# Patient Record
Sex: FEMALE | Race: WHITE
[De-identification: names, ages, dates, MRNs, and addresses within clinical notes are randomized per-mention and may not be internally consistent; named-entity substitution may affect disease eponyms.]

---

## 2020-02-03 ENCOUNTER — HOSPITAL ENCOUNTER (EMERGENCY)
Dept: HOSPITAL 95 - ER | Age: 70
Discharge: HOME | End: 2020-02-03
Payer: MEDICARE

## 2020-02-03 VITALS — WEIGHT: 167.99 LBS | BODY MASS INDEX: 26.37 KG/M2 | HEIGHT: 67 IN

## 2020-02-03 DIAGNOSIS — M54.32: Primary | ICD-10-CM

## 2020-02-03 DIAGNOSIS — Z79.899: ICD-10-CM

## 2020-02-03 DIAGNOSIS — Z88.6: ICD-10-CM

## 2020-02-07 ENCOUNTER — HOSPITAL ENCOUNTER (INPATIENT)
Dept: HOSPITAL 95 - ER | Age: 70
LOS: 10 days | Discharge: HOME | DRG: 326 | End: 2020-02-17
Attending: SURGERY | Admitting: SURGERY
Payer: MEDICARE

## 2020-02-07 VITALS — BODY MASS INDEX: 25.26 KG/M2 | WEIGHT: 166.67 LBS | HEIGHT: 67.99 IN

## 2020-02-07 DIAGNOSIS — G93.40: ICD-10-CM

## 2020-02-07 DIAGNOSIS — E87.6: ICD-10-CM

## 2020-02-07 DIAGNOSIS — F32.9: ICD-10-CM

## 2020-02-07 DIAGNOSIS — E78.00: ICD-10-CM

## 2020-02-07 DIAGNOSIS — K25.5: Primary | ICD-10-CM

## 2020-02-07 DIAGNOSIS — I48.0: ICD-10-CM

## 2020-02-07 DIAGNOSIS — E03.9: ICD-10-CM

## 2020-02-07 DIAGNOSIS — F03.91: ICD-10-CM

## 2020-02-07 DIAGNOSIS — E53.8: ICD-10-CM

## 2020-02-07 DIAGNOSIS — K66.8: ICD-10-CM

## 2020-02-07 DIAGNOSIS — K65.9: ICD-10-CM

## 2020-02-07 LAB
ALBUMIN SERPL BCP-MCNC: 3.7 G/DL (ref 3.4–5)
ALBUMIN/GLOB SERPL: 1.2 {RATIO} (ref 0.8–1.8)
ALT SERPL W P-5'-P-CCNC: 23 U/L (ref 12–78)
ANION GAP SERPL CALCULATED.4IONS-SCNC: 7 MMOL/L (ref 6–16)
AST SERPL W P-5'-P-CCNC: 24 U/L (ref 12–37)
BASOPHILS # BLD AUTO: 0.02 K/MM3 (ref 0–0.23)
BASOPHILS NFR BLD AUTO: 0 % (ref 0–2)
BILIRUB SERPL-MCNC: 0.8 MG/DL (ref 0.1–1)
BUN SERPL-MCNC: 21 MG/DL (ref 8–24)
CALCIUM SERPL-MCNC: 8.6 MG/DL (ref 8.5–10.1)
CHLORIDE SERPL-SCNC: 109 MMOL/L (ref 98–108)
CO2 SERPL-SCNC: 25 MMOL/L (ref 21–32)
CREAT SERPL-MCNC: 0.79 MG/DL (ref 0.4–1)
DEPRECATED RDW RBC AUTO: 47.6 FL (ref 35.1–46.3)
EOSINOPHIL # BLD AUTO: 0.03 K/MM3 (ref 0–0.68)
EOSINOPHIL NFR BLD AUTO: 0 % (ref 0–6)
ERYTHROCYTE [DISTWIDTH] IN BLOOD BY AUTOMATED COUNT: 12.1 % (ref 11.7–14.2)
GLOBULIN SER CALC-MCNC: 3.1 G/DL (ref 2.2–4)
GLUCOSE SERPL-MCNC: 169 MG/DL (ref 70–99)
HCT VFR BLD AUTO: 38 % (ref 33–51)
HGB BLD-MCNC: 13 G/DL (ref 11.5–16)
IMM GRANULOCYTES # BLD AUTO: 0.04 K/MM3 (ref 0–0.1)
IMM GRANULOCYTES NFR BLD AUTO: 0 % (ref 0–1)
LYMPHOCYTES # BLD AUTO: 1.42 K/MM3 (ref 0.84–5.2)
LYMPHOCYTES NFR BLD AUTO: 10 % (ref 21–46)
MCHC RBC AUTO-ENTMCNC: 34.2 G/DL (ref 31.5–36.5)
MCV RBC AUTO: 105 FL (ref 80–100)
MONOCYTES # BLD AUTO: 0.73 K/MM3 (ref 0.16–1.47)
MONOCYTES NFR BLD AUTO: 5 % (ref 4–13)
NEUTROPHILS # BLD AUTO: 11.6 K/MM3 (ref 1.96–9.15)
NEUTROPHILS NFR BLD AUTO: 84 % (ref 41–73)
NRBC # BLD AUTO: 0 K/MM3 (ref 0–0.02)
NRBC BLD AUTO-RTO: 0 /100 WBC (ref 0–0.2)
PLATELET # BLD AUTO: 231 K/MM3 (ref 150–400)
POTASSIUM SERPL-SCNC: 3.6 MMOL/L (ref 3.5–5.5)
PROT SERPL-MCNC: 6.8 G/DL (ref 6.4–8.2)
RBC #/AREA URNS HPF: (no result) /HPF (ref 0–2)
SODIUM SERPL-SCNC: 141 MMOL/L (ref 136–145)
SP GR SPEC: 1.02 (ref 1–1.02)
UROBILINOGEN UR STRIP-MCNC: (no result) MG/DL
WBC #/AREA URNS HPF: (no result) /HPF (ref 0–5)

## 2020-02-07 PROCEDURE — 0DU707Z SUPPLEMENT STOMACH, PYLORUS WITH AUTOLOGOUS TISSUE SUBSTITUTE, OPEN APPROACH: ICD-10-PCS | Performed by: SURGERY

## 2020-02-07 PROCEDURE — C9113 INJ PANTOPRAZOLE SODIUM, VIA: HCPCS

## 2020-02-07 PROCEDURE — A9270 NON-COVERED ITEM OR SERVICE: HCPCS

## 2020-02-07 PROCEDURE — P9612 CATHETERIZE FOR URINE SPEC: HCPCS

## 2020-02-07 NOTE — NUR
IT WAS DETERMINED THAT PATIENT WAS OKAY TO SIGN OWN CONSENT. ABLE TO REP0RT
UNDERSTANDING OF ABD SURGERY DUE TO ABD PAIN AND NAME AND

## 2020-02-07 NOTE — NUR
History, Chart, Medications and Allergies reviewed before start of
procedure.PER ER DAUGHTER ON HER WAY FROM [place]. WILL NEED TO GET TELEPHONE
CONSENT FROM DAUGHTER DUE TO HX DEMENTIA.

## 2020-02-08 LAB
ANION GAP SERPL CALCULATED.4IONS-SCNC: 5 MMOL/L (ref 6–16)
BASOPHILS # BLD: 0 K/MM3 (ref 0–0.23)
BASOPHILS NFR BLD: 0 % (ref 0–2)
BUN SERPL-MCNC: 17 MG/DL (ref 8–24)
CALCIUM SERPL-MCNC: 8 MG/DL (ref 8.5–10.1)
CHLORIDE SERPL-SCNC: 113 MMOL/L (ref 98–108)
CO2 SERPL-SCNC: 25 MMOL/L (ref 21–32)
CREAT SERPL-MCNC: 0.84 MG/DL (ref 0.4–1)
DEPRECATED RDW RBC AUTO: 47.7 FL (ref 35.1–46.3)
EOSINOPHIL # BLD: 0 K/MM3 (ref 0–0.68)
EOSINOPHIL NFR BLD: 0 % (ref 0–6)
ERYTHROCYTE [DISTWIDTH] IN BLOOD BY AUTOMATED COUNT: 12.2 % (ref 11.7–14.2)
GLUCOSE SERPL-MCNC: 147 MG/DL (ref 70–99)
HCT VFR BLD AUTO: 35.2 % (ref 33–51)
HGB BLD-MCNC: 11.9 G/DL (ref 11.5–16)
LYMPHOCYTES # BLD: 0.64 K/MM3 (ref 0.84–5.2)
LYMPHOCYTES NFR BLD: 7 % (ref 21–46)
MCHC RBC AUTO-ENTMCNC: 33.8 G/DL (ref 31.5–36.5)
MCV RBC AUTO: 106 FL (ref 80–100)
MONOCYTES # BLD: 0.37 K/MM3 (ref 0.16–1.47)
MONOCYTES NFR BLD: 4 % (ref 4–13)
NEUTS BAND NFR BLD MANUAL: 24 % (ref 0–8)
NEUTS SEG # BLD MANUAL: 8.23 K/MM3 (ref 1.96–9.15)
NEUTS SEG NFR BLD MANUAL: 65 % (ref 41–73)
NRBC # BLD AUTO: 0 K/MM3 (ref 0–0.02)
NRBC BLD AUTO-RTO: 0 /100 WBC (ref 0–0.2)
PLATELET # BLD AUTO: 180 K/MM3 (ref 150–400)
POTASSIUM SERPL-SCNC: 3.4 MMOL/L (ref 3.5–5.5)
SODIUM SERPL-SCNC: 143 MMOL/L (ref 136–145)
TOTAL CELLS COUNTED BLD: 100

## 2020-02-08 NOTE — NUR
PT TO ICU 13 @ 2045 VIA CHRIS WITH PACU RN. PT ALERT AND ORIENTED TO SELF,
LOCATION, MONTH AND FOLLOWING DIRECTIONS. PT CONFUSED TO EVENTS, STS SHE HAS
BEEN IN THE HOSPITAL FOR A COUPLE OF DAYS. PT PLEASANT AND VERY APPRECIATIVE
OF NURSING CARE, VERY FORGETFUL, BUT REDIRECTABLE AND COOPERATIVE.  UPON
ARRIVAL TO UNIT, PT ON 10L O2 PER OXIMIZER. PT TITRATED DOWN TO 2L O2 PER NC
(SEE FLOWSHEET), O2 SATURATIONS>90%. MONITOR SHOWS SINUS RHYTHM, HR 70'S, BP
STABLE. PT WITH NG, CLAMPED, DENIES NAUSEA AT THIS TIME. CARLY WOUND VAC TO MID
ABD, SMALL AMOUNT OF SANGUINEOUS DRAINAGE NOTED, AALIYAH DRAIN TO LOWER ABD WITH
SEROSANGUINEOUS DRAINAGE. PT DENIES PAIN AT THIS TIME EXCEPT WITH COUGH AND
REPOSITIONING, DENIES NEED FOR PAIN MEDICATION AT THIS TIME, EDUCATED PT ON
SPLINTING WITH PILLOW TO DECREASE PAIN WHILE COUGHING. DOLAN IN PLACE DRAINING
STEPHANIE COLORED URINE, SCD'S IN PLACE, LR AND PROTONIX GTT INITIATED, CALL LIGHT
WITHIN REACH.
PTS DAUGHTER TO ROOM, EXPRESSES CONCERN REGARDING PT LIVING ALONE, STS PT HAS
HAD INCREASED DIFFICULTY REMEMBERING TO TAKE DAILY MEDICATIONS, GO TO DOCTORS
APPOINTMENTS, AND PERFORM ADL'S. PT HAS BEEN RESISTANT TO RECEIVING ANY SORT
OF ASSISTANCE FROM FAMILY AND CARE GIVERS.

## 2020-02-08 NOTE — NUR
CARE ASSUMED
ASSESSMENT COMPLETED, PT AWAKE, ALERT AND ORINETED, SPEECH SLOW AND SLURRED,
THIS IS BASELINE ACCORDING TO RN REPORT. VSS, PT DENIES PAIN AT THIS TIME, ATE
SOME BREAKFAST WITHOUT DIFFICULTY. PT JAUNDICED, BRUISING NOTED TO UE'S, LE'S
EDEMATOUS, ABX OINTMENT APPLIED PER ORDERS. R INNER THIGH WEEPING, DRESSING
CHANGED. PT DENIES C/O AT THIS TIME, IS PLEASANT AND COOPERATIVE. WIFE AT
BEDSIDE. LANETTE GRIJALVA AND YEE IN TO ASSESS, PT TO DIALYSIS AT 0905.

## 2020-02-08 NOTE — NUR
SHIFT SUMMARY
 
PT REMAINS STABLE T/O NIGHT, RESTED WELL, AWOKE @ APPROX 0330 WITH SOME
COMPLAINTS OF ABD PAIN, PRN PN MEDICATION PROVIDED, SEE CALL PLACED TO DR STANLEY. PT ON 2L O2 PER NC, OXYGEN SATURATIONS>90%. MONITOR SHOWS SINUS
RHYTHM, HR 70'S, BP STABLE. WOUND VAC IN PLACE, SMALL AMOUNT OF DRAINAGE.
AALIYAH DRAIN IN PLACE WITH 90ml DRAINAGE THIS SHIFT. PT COMPLAINS OF THIRST,
ORAL SWABS AND MOISTURIZER PROVIDED. PT VERY FORGETFUL, NEEDING REMINDING OF
NPO STATUS, DOLAN PRESTENT TO DRAIN URINE, AND REMINDING THAT DAUGHTER (WHO
LIVES OUT OF TOWN) VISITED LAST NIGHT AND WILL BE BACK THIS MORNING. PT
REMAINS PLEASANT AND COOPERATIVE, VERY APPRECIATIVE OF NURSING CARE.

## 2020-02-08 NOTE — NUR
CALL PLACED TO DR STANLEY REGARDING MORNING LABS, PAIN AND NG CLARIFICATION.
ORDER FOR DILAUDID PCA, 40 MEQ KCL IV, AND NG TO LOW INTERMITENT SUCTION.

## 2020-02-08 NOTE — NUR
UPDATE
PT CONTINUES TO REST, HAS BEEN SLEEPING MOST OF THE DAY. WOKE UP FOR AN HOUR
EARLIER FOR MD AND FAMILY VISIT. WAKES EASILY, IS CONFUSED/FORGETFUL BUT
APPROPRIATE. VSS T/O SHIFT, ABD ASSESSMENT REMAINS UNCHANGED, NO BT PRESENT.
PHUONG WITH 60ML SEROSANG OUTPUT, NO BLEEDING AT WOUND VAC SITE. SCANT OUTPUT FROM
NGT TO LIWS, OUTPUT GREEN BILE.  URINE OUTPUT LOW TODAY, DR. STANLEY NOTIFIED
EARLIER OF THIS, INSTRUCTIONS TO CONTINUE LR@100ML/HR AND MONITOR OUTPUT. PT
REPOSITIONING SELF IN BED, DENIES NEEDS AT THIS TIME.
 DISCUSSED PLAN OF CARE WITH PT'S SON, DAUGHTER AND DR. CABRALES.

## 2020-02-08 NOTE — NUR
CARE ASSUMED
ASSESSMENT COMPLETED, PT AWAKE AND ALERT, FORGETFUL, PLEASANT, VSS. CARLY
DRESSING TO MID ABD DRY AND INTACT WITH A SMALL AMOUNT OF DRIED BLOOD,
PRESENT, PHUONG DRAIN TO LLQ COMPRESSED WITH SEROSANG DRAINAGE. PT REPORTS 8/10
MID ABD PAIN, DENIES NAUSEA, MEDICATED PER ORDERS. BT ABSENT, NO DISTENSION
NOTED, ABD SOFT. DOLAN PATENT AND DRAINING, PROTONIX AND POTASSIUM INFUSING
PER ORDERS. NGT TO LIWS WITH SMALL AMOUNT OF GREEN BILE. DAUGHTER AT BEDSIDE
INTERMITTENTLY, PT SLEEPING AFTER DILAUDID.

## 2020-02-08 NOTE — NUR
FOLLOW UP WITH DR STANLEY REGARDING DILAUDID PCA, ORDER CHANGED TO 0.4MG
DILAUDID IV Q2H PRN FOR PAIN.

## 2020-02-08 NOTE — NUR
UPDATE
PT'S SON AND DAUGHTER AT BEDSIDE, DR. STANLEY IN TO SPEAK WITH PT AND FAMILY.
 CONSULTED FOR FAMILY CONCERNS OF PATIENT HAVING DEMENTIA AND
BEING UNABLE TO CARE FOR HERSELF AT HOME. PT CONTINUES TO REST, WAKES EASILY,
VSS. PT PALE, BP AND HR WNL.

## 2020-02-08 NOTE — NUR
PER PT'S DAUGHTER, PT HAS A DEEP BRAIN STIMULATOR IMPLANT IN LEFT SUBCLAVIAN
AREA TO TREAT ESSENTIAL TREMORS. PT DOES NOT HAVE AN AICD.

## 2020-02-08 NOTE — NUR
PT RESTING WELL AFTER DILAUDID, WOKEN FOR REPOSITIONING AND C/O PAIN,
MEDICATED AGAIN PER ORDERS. ABD ASSESSMENT UNCHANGED, VS REMAIN STABLE, PT
ORINETED BUT FORGETFUL, DENIES OTHER NEEDS AT THIS TIME, COOPERATIVE WITH
CARE.

## 2020-02-08 NOTE — NUR
ASSUMED CARE OF PT AT 1915. REPORT RECEIVED AT BEDSIDE. PT PRESENTS IN BED
SLEEPING. PT IN NO APPARENT DISTRESS AT THIS TIME. WILL COMPLETE ASSESSMENT
UPON NEXT PT CHECK. ALLOW PT TO REST. WILL REVIEW CHART AND PLAN OF CARE FOR
PT.

## 2020-02-09 LAB
ALBUMIN SERPL BCP-MCNC: 2.6 G/DL (ref 3.4–5)
ALBUMIN/GLOB SERPL: 0.8 {RATIO} (ref 0.8–1.8)
ALT SERPL W P-5'-P-CCNC: 22 U/L (ref 12–78)
ANION GAP SERPL CALCULATED.4IONS-SCNC: 3 MMOL/L (ref 6–16)
AST SERPL W P-5'-P-CCNC: 16 U/L (ref 12–37)
BASOPHILS # BLD AUTO: 0.02 K/MM3 (ref 0–0.23)
BASOPHILS NFR BLD AUTO: 0 % (ref 0–2)
BILIRUB SERPL-MCNC: 0.6 MG/DL (ref 0.1–1)
BUN SERPL-MCNC: 15 MG/DL (ref 8–24)
CALCIUM SERPL-MCNC: 8.3 MG/DL (ref 8.5–10.1)
CHLORIDE SERPL-SCNC: 113 MMOL/L (ref 98–108)
CO2 SERPL-SCNC: 28 MMOL/L (ref 21–32)
CREAT SERPL-MCNC: 0.76 MG/DL (ref 0.4–1)
DEPRECATED RDW RBC AUTO: 48.5 FL (ref 35.1–46.3)
EOSINOPHIL # BLD AUTO: 0.06 K/MM3 (ref 0–0.68)
EOSINOPHIL NFR BLD AUTO: 1 % (ref 0–6)
ERYTHROCYTE [DISTWIDTH] IN BLOOD BY AUTOMATED COUNT: 12.2 % (ref 11.7–14.2)
GLOBULIN SER CALC-MCNC: 3.1 G/DL (ref 2.2–4)
GLUCOSE SERPL-MCNC: 88 MG/DL (ref 70–99)
HCT VFR BLD AUTO: 34.3 % (ref 33–51)
HGB BLD-MCNC: 11.3 G/DL (ref 11.5–16)
IMM GRANULOCYTES # BLD AUTO: 0.03 K/MM3 (ref 0–0.1)
IMM GRANULOCYTES NFR BLD AUTO: 0 % (ref 0–1)
LYMPHOCYTES # BLD AUTO: 1.02 K/MM3 (ref 0.84–5.2)
LYMPHOCYTES NFR BLD AUTO: 15 % (ref 21–46)
MAGNESIUM SERPL-MCNC: 1.8 MG/DL (ref 1.6–2.4)
MCHC RBC AUTO-ENTMCNC: 32.9 G/DL (ref 31.5–36.5)
MCV RBC AUTO: 108 FL (ref 80–100)
MONOCYTES # BLD AUTO: 0.34 K/MM3 (ref 0.16–1.47)
MONOCYTES NFR BLD AUTO: 5 % (ref 4–13)
NEUTROPHILS # BLD AUTO: 5.54 K/MM3 (ref 1.96–9.15)
NEUTROPHILS NFR BLD AUTO: 79 % (ref 41–73)
NRBC # BLD AUTO: 0 K/MM3 (ref 0–0.02)
NRBC BLD AUTO-RTO: 0 /100 WBC (ref 0–0.2)
PLATELET # BLD AUTO: 149 K/MM3 (ref 150–400)
POTASSIUM SERPL-SCNC: 3.8 MMOL/L (ref 3.5–5.5)
PROT SERPL-MCNC: 5.7 G/DL (ref 6.4–8.2)
SODIUM SERPL-SCNC: 144 MMOL/L (ref 136–145)
TSH SERPL DL<=0.005 MIU/L-ACNC: 22.3 UIU/ML (ref 0.36–4.8)

## 2020-02-09 NOTE — NUR
PT'S NGT HAS ONLY SMALL AMOUNT OF CLEAR LIGHT GREEN LIQUID. PT HAS DENIED ANY
FLATUS THIS NIGHT. DOES HAVE HYPOACTIVE BOWEL TONES. WILL CONTINUE TO MONITOR
PT, AND WILL REPORT OFF TO ONCOMING RN.

## 2020-02-09 NOTE — NUR
PT BECAME HIGHLY AGITATED SCREAMING ABOUT GOING HOME TO TAKE CARE OF DOGS. PT
REMINDED THAT DAUGHTER IS TAKING CARE OF THEM, REFUSES TO BELEIVE STAFF AND
IS CONVINCED THAT SHE CAME IN ON HER OWN. PT MANAGED TO SLIP OUT OF RIGHT HAND
RESTRAINT AND SWUNG AT MYSELF WITTNESSED BY [facility]. PT THREATENED TO
PUNCH ME IN THE FACE.

## 2020-02-09 NOTE — NUR
PT RESTLESS, PULLING A RESTRAINTS, FORGETFUL . REORIENTATED AND 5 MINUTES
LATER PT REPEATS SAME REQUEST AND ATTEMPS TO GET OUT OF BED AND PULL OUT LINES

## 2020-02-09 NOTE — NUR
PT REQUETING TO GO HOME TO TAKE CARE OF DOGS. FORGOT DAUGHTER VISITED TODAY
AND DOES NOT KNOW WHY SHE CAME IN TO HOSPITAL. REQUESTED DOCTOR COME EVALUATE.
FORGETS SHE HAD SURGERY.

## 2020-02-09 NOTE — NUR
PT HAS BEEN MEDICATED THREE TIMES THIS NIGHT WITH 0.4 MG DILAUDID FOR
ABDOMINAL PAIN. PT'S PHUONG DRAIN HAS BEEN EMPTIED  ML SEROUSANGEOUS FLUID.
PT REMAINS PLEASANT AND VERY APPRECIATIVE OF STAFF. SHE HAS BEEN ABLE TO
REPOSITION HERSELF AT TIMES IN BED.

## 2020-02-09 NOTE — NUR
ASSUMED CARE AT 1915. PT ALERT TO SELF AND FAMILY AT SHIFT CHANGE. VITAL WNL.
SHORT TERM MEMORY LOSS. FORGETS SHE IS IN THE HOSPITAL MID CONVERSATION.
BECOMES AGITATED WITH THE RESTRAINTS, PT BELIEVE SHE IS AT HOME EVEN AFTER
REORIENTATED. DENIES PAIN BUT IS IN DISCOMFORT, WITH MOVEMENT AND REPOSITION.

## 2020-02-09 NOTE — NUR
PT HAS TOLERATED TURNS IN BED WELL. ABLE TO ASSIST WITH REPOSITIONING. NO
CHANGES TO PREVIOUS ASSESSMENT OF DRESSINGS. PHUONG BULB REMAINS COMPRESSED FOR
SUCTION. WILL CONTINUE TO MONITOR.

## 2020-02-09 NOTE — NUR
SHIFT SUMMARY
 
TODAY HAS BEEN QUITE THE DAY. SHE IS ALERT AND HAS BEEN AWAKE FOR MAJORITY OF
DAY, AND SHE IS ORIENTED TO HERSELF, FAMILY, AND GENERAL SURROUNDINGS. SHE HAS
SIGNS OF DEMENTIA, LIKE SHORT TERM MEMORY LOSS AND COGNITIVE DYSFUNCTION.
 
SOME EXAMPLES:
 
1. I PLACED A CUP OF WATER WITH MOUTH-WATTERING SWABS BY THE BED, AND SHE
PICKED UP THE CUP AND TRIED TO SUCK WATER THROUGH THE SWABS LIKE AS IF THEY
WERE STRAWS. AFTER STOPPING HER AND EXPLAINING WHAT THEY WERE, SHE PROCEEDED
TO DO IT AGAIN 3 MINUTES LATER.
 
2. SHE KEEPS TELLING ME ABOUT HER KIDS AND WHAT THEY DO PROFESSIONALLY AND
KEEPS GETTING SURPRISED THAT THEY HAVE BEEN VISITING ALL DAY.
 
3. ASKING WHEN SHE CAN LEAVE SO SHE CAN GO HOME AND TAKE CARE OF HER DOGS AND
NOT REALIZING SHE HAS HAD A SURGERY (OVER AND OVER AGAIN).  ALSO FORGETTING
THAT HER DAUGHTER IS VISITING FROM [place] AND HAS BEEN STAYING AT THE HOUSE.
 
4. KEEPS MESSING WITH THINGS THAT ARE CONNECTED TO HER (LINES, TUBES, DRAINS)
AND THINKING THEY ARE DIFFERENT THINGS. LIKKE THINKING THE PULSE OX WAS THE
CALL LIGHT BUTTON.
 
HER DAUGHTER, STATES, THIS IS HER BASELINE AT HOME. LIBBY (HER SON) AND
CAMI ARE BOTH VERY CONCERNED FOR HER SAFETY. FEELING THAT SHE IS UNABLE TO
TAKE CARE OF HERSELF. THEY WISH TO GAIN GAURDIANSHIP. THE LAST FEW HOURS SHES
BEEN TRYING TO GET UP, ONE TIME BEING SUCCESSFUL STANDING UP AND GETTING A FEW
STEPS AWAY FROM THE BED. HER DOLAN WAS TAKEN OUT (BALLOON STILL INFLATED) AND
WAS BLEEDING FROM HER URETHRA, WHICH HAD STOPPED SHORTLY AFTER. ONE OF HER
IV'S CAME OUT, SHE HAD PULLED OUT HER NG TUBE OUT TOO AND BROKE THE CARLY WOULD
VAC ALONG WITH TAKING OFF THE PHUONG DRAIN DRESSING. AND FORGETTING ABOUT DOING
ALL OF THIS. THIS OCCURED AND HAS BEEN OCCURING AFTER THE FAMILY LEFT. SHE HAD
BEEN IN [facility] ALL DAY, AND DESPITE BEING FORGETFUL AND CONFUSED. SHE
HAS SINCE BECOME VERY AGITATED, DEMANDING SHE LEAVE AND FEED HER DOGS. SHE HAS
CALLED HER FRIEND TO COME PICK HER UP, SAYING SHE IS READY TO GO HOME. WITH
EVERYTHING THAT HAPPENED AND IN ORDER TO KEEP HER SAFE SHE WAS PUT IN
RESTRAINTS BOTH A POSEY AND BILATERAL SWBs. DR ROBINS ORDERED 0.25MG ATIVAN
BUT HAD MINIMAL EFFECT. SHE REMAINS IN BED IN RESTRAINTS, SLIGHTLY MORE CALM
BUT REMAINS AGITATED AND CONVINCED SHE NEEDS TO LEAVE.
 
 IS CONSULTED AND WILL FOLLOW UP WITH FAMILY AND PT. BED LOW
AND LOCKED. CALL LIGHT HAS BEEN IN REACH ALL DAY.

## 2020-02-09 NOTE — NUR
ASSUMED CARE
 
PT IS IN BED, ALERT AND ORIENTED TO SELF, FAMILY, SURROUNDINGS (NOT PLACE -
THOUGHT SHE WAS IN A [facility] OR [facility]) AND SORT OF THE
SITUATION. SHE HAS SOME MEMORY ISSUES, ASKING THE SIMILAR QUESTIONS IN A ROW.
SHE HAS A MIDLINE INCISION THAT HAS A CARLY WOUND VAC TO IT, DRESSING HAS
MINIMAL DRAINAGE THAT IS NOT INCREASING, AND SHE HAS A PHUONG DRAIN IN HER LLQ
WITH MINIMAL SEROSANGUINOUS DRAINAGE. I BELIEVE SHE IS IN A SINUS RHYTHM BUT
WITH SOME T WAVE ABNORMALITIES AND POTENTIALLY A BUNDLE BRANCH BLOCK OF SOME
KIND. SHE HAS A STABLE RATE, AND SLIGHTLY ELEVATED BLOOD PRESSURES. SHE DENIES
PAIN UNLESS SHE COUGHS OR LAUGHS, KIMMY INSTRUCTED HER HOW TO USE A PILLOW WHEN
SHE HAS TO DO THOSE TWO THINGS. SHE CURRENTLY IS GONE TO IMAGING FOR HER CT
SCAN. SHE HAS A NG TUBE HOOKED UP TO LIS SHOWING A BILE-COLORED LIQUID. DENIES
NAUSEA. BED LOW AND LOCKED. CALL LIGHT WITHIN REACH.

## 2020-02-10 LAB
ALBUMIN SERPL BCP-MCNC: 2.6 G/DL (ref 3.4–5)
ANION GAP SERPL CALCULATED.4IONS-SCNC: 5 MMOL/L (ref 6–16)
BASOPHILS # BLD AUTO: 0.02 K/MM3 (ref 0–0.23)
BASOPHILS NFR BLD AUTO: 0 % (ref 0–2)
BUN SERPL-MCNC: 12 MG/DL (ref 8–24)
CALCIUM SERPL-MCNC: 8.3 MG/DL (ref 8.5–10.1)
CHLORIDE SERPL-SCNC: 108 MMOL/L (ref 98–108)
CO2 SERPL-SCNC: 27 MMOL/L (ref 21–32)
CREAT SERPL-MCNC: 0.61 MG/DL (ref 0.4–1)
DEPRECATED RDW RBC AUTO: 45.4 FL (ref 35.1–46.3)
EOSINOPHIL # BLD AUTO: 0.13 K/MM3 (ref 0–0.68)
EOSINOPHIL NFR BLD AUTO: 2 % (ref 0–6)
ERYTHROCYTE [DISTWIDTH] IN BLOOD BY AUTOMATED COUNT: 11.9 % (ref 11.7–14.2)
GLUCOSE SERPL-MCNC: 86 MG/DL (ref 70–99)
HCT VFR BLD AUTO: 32 % (ref 33–51)
HGB BLD-MCNC: 11 G/DL (ref 11.5–16)
IMM GRANULOCYTES # BLD AUTO: 0.03 K/MM3 (ref 0–0.1)
IMM GRANULOCYTES NFR BLD AUTO: 0 % (ref 0–1)
LYMPHOCYTES # BLD AUTO: 0.74 K/MM3 (ref 0.84–5.2)
LYMPHOCYTES NFR BLD AUTO: 11 % (ref 21–46)
MAGNESIUM SERPL-MCNC: 1.7 MG/DL (ref 1.6–2.4)
MCHC RBC AUTO-ENTMCNC: 34.4 G/DL (ref 31.5–36.5)
MCV RBC AUTO: 105 FL (ref 80–100)
MONOCYTES # BLD AUTO: 0.39 K/MM3 (ref 0.16–1.47)
MONOCYTES NFR BLD AUTO: 6 % (ref 4–13)
NEUTROPHILS # BLD AUTO: 5.44 K/MM3 (ref 1.96–9.15)
NEUTROPHILS NFR BLD AUTO: 81 % (ref 41–73)
NRBC # BLD AUTO: 0 K/MM3 (ref 0–0.02)
NRBC BLD AUTO-RTO: 0 /100 WBC (ref 0–0.2)
PHOSPHATE SERPL-MCNC: 1.7 MG/DL (ref 2.5–4.9)
PLATELET # BLD AUTO: 164 K/MM3 (ref 150–400)
POTASSIUM SERPL-SCNC: 3.3 MMOL/L (ref 3.5–5.5)
SODIUM SERPL-SCNC: 140 MMOL/L (ref 136–145)

## 2020-02-10 NOTE — NUR
BEDSIDE REPORT TAKEN AT 0715 THIS AM. PT'S DAUGHTER PRESENT AT BEDSIDE. PT
PLEASANT AND COOPERATIVE AT BEGINNING OF SHIFT WHILE DAUGHTER PRESENT. PT
ASSISTED T/O SHIFT TO COMMODE AND THEN TO TOILET TO VOID. PT ABLE TO BEAR
WEIGHT BUT HAS UNSTAEDY GAIT AND SOME WEAKNESS NOTED. PT IMPULSIVE AND NEEDS
SUPERVISION AT ALL TIMES. KELLIE VEST REMOVED THIS AM AT 1000 WITH DAUGHTER AT
BEDSIDE. DR VALLADARES IN TO SEE PT AND SPEAK W PT'S DAUGHTER AT 1120. CONSULT
WITH DR BOYD ORDERED FOR COGNITIVE ASSESSMENT 2ND TO DEMENTIA. KPHOS
RIDER ORDERED AND INFUSED. LR AT 100CC/HR AS PT NPO. DR STANLEY IN TO SEE PT
AT 0900; ABD XRAY ORDERED; PT DOWN TO XRAY FOR IMAGES. PLAN IS FOR A BARRIUM
SWALLOW TOMORROW AND THEN POSSIBLE DIET TOMORROW. DRSG TO MIDLINE ABD C/D/I
WITH CARLY WOUND VAC; SCANT TO NO DRAINAGE NOTED. PHUONG TO LLQ PUTTING OUT SCANT
SS FLUID.
AFTER PT'S DAUGHTER LEFT PT BECAME MORE AGITATED. PT CONTINUOUSLY CLIMBED OUT
OF BED WITHOUT ASSISTANCE; BED ALARM ON. PT WOULD STATE THAT SHE WAS GOING
HOME. PT WAS CONFUSED ABOUT DETAILS SURROUNDING SURGERY AND STATED THAT SHE
WAS FINE AND HAD JUST EATEN. PT BECAME PHYSICAL AT ONE POINT GRABBING MY ARM
AND TRYING TO WALK PAST/THROUGH ME. PT WAS DISTRACTED WITH PHYSICAL THERAPY;
BUT ONCE AGAIN BECAME FURTHER CONFUSED AND AGITATED. SHE PULLED OUT TWO IV'S,
BOTH INADVERTENTLY WHILE TRYING TO "LEAVE THE HOSPITAL TO GO HOME". DR BOYD CONSULTED PT AND PLACED PT ON 2MD HOLD AND RECOMMENDED PLACEMENT
IN [facility].
AFTER PHYSICAL THERAPY, PT WAS PLACED BACK ON TELEMETRY; PT WAS FOUND TO BE IN
AFIB W RVR; RATE 110-136. PT HYPERTENSIVE AS WELL. DR VALLADARES CALLED. ONE DOSE
OF IV CARDIAZEM 10MG GIVEN. PT ATTEMPTED TO GET OUT OF BED AGAIN SHORTLY
AFTER THIS. PT REFUSED TO COOPERATE WITH PLACEMENT OF POSEY AND BECAME VERY
AGITATED, ATTEMPTING TO LEAVE AGAIN. ATIVAN/BENADRYL/AND HALDOL WERE GIVEN
TOGETHER AS DR BOYD HAD RECOMMENDED. PT ASLEEP SHORTLY THERE AFTER. HTN
IMPROVED AND PT CONVERTED BACK INTO SINUS BRADYCARDIA. PT AROUSES FROM SLEEP
TO VOICE. POSEY VEST PLACED. REPORT GIVEN AT BEDSIDE AT 1920. CALL PLACED TO
PT'S DAUGHTER TO GIVE UPDATE.

## 2020-02-10 NOTE — NUR
Per admit trigger, I attempted to meet with Mrs. Broderick to offer prayer and
spiritual support.  She has been quite confused all day.  No family present.
this evening, she is finally resting.  Prayer provided at bedside.  I will
remain available to pt and family.

## 2020-02-10 NOTE — NUR
PT CONFUSED, SHORT-TERM MEMORY LOSS, DID NOT REMEMBER HAVING SURGERY, AND WAS
CONSTANLY TYING TO GET OUT OF AND AND LEAVE HOSPITAL. NO RECOLECTION OF HOW
SHE ARRIVED AT THE HOSPITAL. WHEN TOLD AND SHOWN SURGERY SITE AND DRAINS
WOULD SAY IT WAS OLD. AT ONE POINT SHE WAS THREATENING MYSELF AND DID SWING
TOWARDS MY FACE. PROVIDER CALLED HALDOL ORDERED AND GIVEN. PT STILL AGITATED.
SHE DID VERBALIZE PAIN ONCE. SHE DOES MOAN AND GRUNT WITH REPOSTION AND TURNS.
BUT REFUSED ANY MORE PAIN MEDS. SHE WAS ABLE TO SLIP OUT OF WRIST RESTRAINTS
MULTIPLE TIME, AND ATTEMPED TO GET OUT OF BED. PT DID NOT PULL AT IV,LINES
OR DRAIN AND WAS ABLE TO REST COMFORTABLE IN VEST ONLY. THE WRIST RESTRAINTS
WERE CASUING MORE AGGITATION THROUGHOUT THE NIGHT. NEW RESTAINT ORDER PLACED
FOR VEST ONLY. PT WOKE UP MORE COOPERTIVE.  WITH THAT SAID I DO BELEIVE SHE IS
A FALL/SAFETY RISK AND MAY NEED ASSISTANTS AT HOME AS WELL. POSSIBLY SUN
DOWNING. WILL CONTINUE TO MONITOR, MAY NEED PSYCH EVAL TO DX DEMENTIA OR
SIMULAR DIEASE PROCESS.

## 2020-02-10 NOTE — NUR
PT SLIPPED OUT OF RESTRAINT AGAIN. PT VERBALIZED PAIN, DILAUDID GIVEN. PT
STILL TRYING TO GO HOME AND DENIES HAVING SURGEY.

## 2020-02-10 NOTE — NUR
HELP PT TO BEDSIDE TOILET WITH NO OUTPUT. PLACED BACK IN BED. WHEN I WENT
REAPPLY RESTRAINTS PT SAT UP AND TRIED TO GET OUT OF BED AND GO HOME. WITH THE
CHARGE NURSE ASSISTED TO GET HER TO LAY BACK DOWN. ONCE SHE WAS LAID FLAT PT
WENT TO SLEEP.

## 2020-02-10 NOTE — NUR
ASSUMPTION OF CARE
 
ASSUMED CARE OF PT @ 1915, PT SLEEPING IN BED, REPORT FROM PREVIOUS SHIFT PT
MEDICATED WITH ATIVAN, BENADRYL AND ATIVAN. PT OPENS EYES WITH VERBAL STIMULI
AND GRIMACES WITH PAINFUL STIMULI, MUMBLES SOME WORDS AND QUICKLY FALLS BACK
ASLEEP. RESPIRATIONS EVEN AND UNLABORED, O2 SATURATIONS>90% ON 2L PER NC.
MONITOR SHOWS SINUS RHYTHM WITH HR 48-50'S, BP STABLE. IMPLANTED
NEUROSTIMULATOR DEVICE TO L UPPER CHEST. BOWEL TONES HYPOACTIVE, CARLY WOUND
VAC TO MIDLINE ABD, PHUONG DRAIN TO LLQ MINIMAL DRAINAGE NOTED. BED ALARM ON,
POSEY VEST IN PLACE. CALL LIGHT WITHIN REACH.

## 2020-02-10 NOTE — NUR
PT WOKE UP FROM [place]. DOES NOT REMEMBER ME OR OTHER STAFF, UNABLE TO REMEMBER
HOW SHE ARRIVED TO HOSPITAL AND IS DEMANDING US TO LET HER GO HOME. ABLE TO
PLAY THE PART AND TRY TO SAY WHAT STAFF WANTS TO HEAR IN ABLE TO LET HER
LEAVE. SHE DOES NOT REALIZE THAT SHE IS RECOVERING FROM A SURGERY. SHE IS
DENIAL AND IS A SAFETY RISK AND A CONCERN FOR HOME SAFETY BEING SHE LIVES
ALONE WITH HER DOGS.

## 2020-02-11 LAB
ALBUMIN SERPL BCP-MCNC: 2.4 G/DL (ref 3.4–5)
ALBUMIN/GLOB SERPL: 0.8 {RATIO} (ref 0.8–1.8)
ALT SERPL W P-5'-P-CCNC: 20 U/L (ref 12–78)
ANION GAP SERPL CALCULATED.4IONS-SCNC: 6 MMOL/L (ref 6–16)
AST SERPL W P-5'-P-CCNC: 17 U/L (ref 12–37)
BASOPHILS # BLD AUTO: 0.03 K/MM3 (ref 0–0.23)
BASOPHILS NFR BLD AUTO: 1 % (ref 0–2)
BILIRUB SERPL-MCNC: 0.8 MG/DL (ref 0.1–1)
BUN SERPL-MCNC: 11 MG/DL (ref 8–24)
CALCIUM SERPL-MCNC: 8.2 MG/DL (ref 8.5–10.1)
CHLORIDE SERPL-SCNC: 108 MMOL/L (ref 98–108)
CO2 SERPL-SCNC: 26 MMOL/L (ref 21–32)
CREAT SERPL-MCNC: 0.69 MG/DL (ref 0.4–1)
DEPRECATED RDW RBC AUTO: 43.7 FL (ref 35.1–46.3)
EOSINOPHIL # BLD AUTO: 0.23 K/MM3 (ref 0–0.68)
EOSINOPHIL NFR BLD AUTO: 4 % (ref 0–6)
ERYTHROCYTE [DISTWIDTH] IN BLOOD BY AUTOMATED COUNT: 11.7 % (ref 11.7–14.2)
GLOBULIN SER CALC-MCNC: 2.9 G/DL (ref 2.2–4)
GLUCOSE SERPL-MCNC: 79 MG/DL (ref 70–99)
HCT VFR BLD AUTO: 32.6 % (ref 33–51)
HGB BLD-MCNC: 11.4 G/DL (ref 11.5–16)
IMM GRANULOCYTES # BLD AUTO: 0.01 K/MM3 (ref 0–0.1)
IMM GRANULOCYTES NFR BLD AUTO: 0 % (ref 0–1)
LYMPHOCYTES # BLD AUTO: 0.91 K/MM3 (ref 0.84–5.2)
LYMPHOCYTES NFR BLD AUTO: 17 % (ref 21–46)
MAGNESIUM SERPL-MCNC: 1.6 MG/DL (ref 1.6–2.4)
MCHC RBC AUTO-ENTMCNC: 35 G/DL (ref 31.5–36.5)
MCV RBC AUTO: 102 FL (ref 80–100)
MONOCYTES # BLD AUTO: 0.4 K/MM3 (ref 0.16–1.47)
MONOCYTES NFR BLD AUTO: 7 % (ref 4–13)
NEUTROPHILS # BLD AUTO: 3.83 K/MM3 (ref 1.96–9.15)
NEUTROPHILS NFR BLD AUTO: 71 % (ref 41–73)
NRBC # BLD AUTO: 0 K/MM3 (ref 0–0.02)
NRBC BLD AUTO-RTO: 0 /100 WBC (ref 0–0.2)
PHOSPHATE SERPL-MCNC: 2.6 MG/DL (ref 2.5–4.9)
PLATELET # BLD AUTO: 182 K/MM3 (ref 150–400)
POTASSIUM SERPL-SCNC: 3.2 MMOL/L (ref 3.5–5.5)
PROT SERPL-MCNC: 5.3 G/DL (ref 6.4–8.2)
SODIUM SERPL-SCNC: 140 MMOL/L (ref 136–145)

## 2020-02-11 NOTE — NUR
REPORT CALLED TO TRACIE NEAL ON MEDICAL FLOOR. UPDATED DAUGHTER ON PTS STATUS AND
TRANSFER TO ROOM 346.

## 2020-02-11 NOTE — NUR
PT. BACK TO ROOM AFTER HAVING IMAGING WITH CONTRAST.  HAS HAD DIARRHEA
SEVEERAL TIMES SINCE RETURNING TO ROOM. PT. A STANDBY ASSIST TO THE BATHROOM.
PT. HAS NOT BEEN COMBATIVE TODAY AND HAS BEEN COOPERATIVE. PT. IS SLEEPING AT
THIS TIE.

## 2020-02-11 NOTE — NUR
TRANSFER TO ROOM 346
PT ARRIVED TO ROOM 346 FROM ICU13 AT APPROXIMATELY 0550. PT ALERT AND ORIENTED
TO SELF AND STAFF, FOLLOWING DIRECTION AND IS PLEASANT. PT ABLE TO TRANFER TO
NEW BED WITH A 1 PERSON ASSIST. ASSISTED PT TO BSC AND THEN HELPED BACK TO
BED, POSEY VEST ON. SHERIF CHANDLER STARTED. WILL CONTINUE TO MONITOR.

## 2020-02-11 NOTE — NUR
PT. TO RADIOLOGY FOR PROCEDURE VIA RIMARMANJIT.  KLELIE VEST REMOVED BEFORE SHE LEFT
AND IV S.L. LOCKED.REMOVED TELE AND NOTIFIED MONITOR TECH.

## 2020-02-12 NOTE — NUR
SHIFT SUMMARY
PT IS A 68 Y/O FEMALE, ADMITTED FOR PERITONITIS. SHE RECENTLY HAD AN ABD
SURGICAL REPAIR OF A PERFORATED ULCER, WITH A WOUND VAC IN PLACE ON THE
SURGICAL SITE AND A PHUONG DRAIN IN THE L MID ABD. PT IS CONFUSED, A&O X SELF
ONLY, AND A 1PA OUT OF BED. SHE WAS OVERALL COOPERATIVE WITH CARE. PT HAD
SEVERAL EPISODES OF DIARRHEA DURING THE NIGHT, INCLUDING ONE INCONTINENT
EPISODE. NO COMPLAINTS OF PAIN, NAUSEA OR SOB. PT SLEPT WELL THROUGH THE
NIGHT. VITAL SIGNS STABLE. NO OTHER ACUTE CHANGES IN PT CONDITION NOTED. WILL
CONTINUE TO MONITOR AND TREAT PER EMAR UNTIL HAND OFF TO DAY SHIFT RN.

## 2020-02-12 NOTE — NUR
PT. HAS PRETTY ANTSY ALL DAY TODAY, WOULDNOT STAY IN BED LOKKING FOR PHONE,
HER DOGS.  INSISTING SHE WAS GOING HOME BUT WITH ALL THIS SHE WAS REDIREECTED
EASILY.  THIS EVENING SHE WAS TRYING TO PULL HER PHUONG AS SHE WAS DEETERMINED SHE
WAS GOING HOME.  COULD NOT REDIRECT HER.  CALLED DR. BOYD IF WE COULD
GET SOME PO MEDS TO CALM HER DOWN. ZYPREXA 5 MG. GIVEN WHICH WAS NOT HELPING
AN HOUR LATER,  THEN PRESCRIBED 50 OF SEROQUEL.  AN HOUR LATER THERE WAS
NOT ANY CHANGE IN HER.  RESTRAINTS HAD TO BE PLACED FOR HER SAFETY WHICH SHE
WAS ABLE TO GET OUT OF.  CALLED DR. PICKETT AND HE ORDEERED A B52 SHOT
WHICH TOOK QUITE SOME TIME TO WORK.  NIGHT SHIFT STILL DEALING WITH HER, SHE
IS SCREAMING FOR JAMEEL WHO SHE SAYS IS HER .

## 2020-02-13 LAB
ANION GAP SERPL CALCULATED.4IONS-SCNC: 7 MMOL/L (ref 6–16)
BASOPHILS # BLD AUTO: 0.05 K/MM3 (ref 0–0.23)
BASOPHILS NFR BLD AUTO: 1 % (ref 0–2)
BUN SERPL-MCNC: 5 MG/DL (ref 8–24)
CALCIUM SERPL-MCNC: 8.4 MG/DL (ref 8.5–10.1)
CHLORIDE SERPL-SCNC: 111 MMOL/L (ref 98–108)
CO2 SERPL-SCNC: 26 MMOL/L (ref 21–32)
CREAT SERPL-MCNC: 0.65 MG/DL (ref 0.4–1)
DEPRECATED RDW RBC AUTO: 43.4 FL (ref 35.1–46.3)
EOSINOPHIL # BLD AUTO: 0.22 K/MM3 (ref 0–0.68)
EOSINOPHIL NFR BLD AUTO: 3 % (ref 0–6)
ERYTHROCYTE [DISTWIDTH] IN BLOOD BY AUTOMATED COUNT: 11.8 % (ref 11.7–14.2)
GLUCOSE SERPL-MCNC: 115 MG/DL (ref 70–99)
HCT VFR BLD AUTO: 36.4 % (ref 33–51)
HGB BLD-MCNC: 13.1 G/DL (ref 11.5–16)
IMM GRANULOCYTES # BLD AUTO: 0.03 K/MM3 (ref 0–0.1)
IMM GRANULOCYTES NFR BLD AUTO: 0 % (ref 0–1)
LYMPHOCYTES # BLD AUTO: 1.02 K/MM3 (ref 0.84–5.2)
LYMPHOCYTES NFR BLD AUTO: 15 % (ref 21–46)
MCHC RBC AUTO-ENTMCNC: 36 G/DL (ref 31.5–36.5)
MCV RBC AUTO: 101 FL (ref 80–100)
MONOCYTES # BLD AUTO: 0.61 K/MM3 (ref 0.16–1.47)
MONOCYTES NFR BLD AUTO: 9 % (ref 4–13)
NEUTROPHILS # BLD AUTO: 5.08 K/MM3 (ref 1.96–9.15)
NEUTROPHILS NFR BLD AUTO: 73 % (ref 41–73)
NRBC # BLD AUTO: 0 K/MM3 (ref 0–0.02)
NRBC BLD AUTO-RTO: 0 /100 WBC (ref 0–0.2)
PLATELET # BLD AUTO: 229 K/MM3 (ref 150–400)
POTASSIUM SERPL-SCNC: 2.8 MMOL/L (ref 3.5–5.5)
SODIUM SERPL-SCNC: 144 MMOL/L (ref 136–145)

## 2020-02-13 NOTE — NUR
PT. UP IN CHAIR FOR BREAKFAST, TOLERATED WELL BUT POSEY VEST IN PLACE.  2
PERSON ASSIST R/T WEAKNESS.  2 CNA'S TRANSFERRED PT. TO [facility] STOOD WELL ,THEN
SAT DOWN, CLLED TO ROOM BY CNA'S AS PT. HAD PASSED OUT, HRR, BREATHING EVEN,
BUT NO REPOSONSE.  CALLED RR AT 1026.  WENT TO MED ROOM AND PULLED NARCAN IN
CASE PT. OVERMEDICATED, ENDED UP NOT GIVING NARCAN AS PHARMACIST SAID IT WOULD
NOT WORK FOR THE MEDICATIONS HE WAS GIVEN, PT. TRANSFERRED TO BED, KELLIE VEST
REMOVED.  EKG DONE AT 1041 WHICH SHOWED PROLONGED T WAVE.  DR. VALLADARES ORDERED
PT. TO PCU.  PT'S KCL LEVEL WAS AT 2.8 AND DUE TO THE FACT PT. WAS ON SEROQUEL
ALSO IT WAS ASSUMED BY ICU RN THE PT. HAD A CARDIAC EVENT. REPORT GIVEN TO
ANDRY WOLFE RN IN PCU AND PT. TRANSFERRED TO PCU-8

## 2020-02-13 NOTE — NUR
PCU DAYSHIFT SUMMARY
PATIENT ARRIVED TO UNIT AT APPROX 1230 - ALERT TO SELF AND FAMILY - CONFUSED
TO SITUATION, DATE AND LOCATION. PATIENT SAT UP IN BED AND FOLLOWED DIRECTIONS
MODERATLY WELL. PATIENT ON ROOM AIR - VSS - RIGHT HEART BLOCK NOTED ON MONITOR
WITH RATE OF 60. PATIENT ATE DINNER WELL UNASSISTED. PATIENT WAS HAVING VISUAL
AND AUDITIORY HALLUCIANTION - SHE STATED SHE HEARD HER NAME CALLED AND ALSO
VERBALIZED THAT SHE WAS SEEING CATS AND DOGS MOVE IN HER ROOM.
AT APPROX 1350 PATIENT BECOME AGITATED AND WANTED TO GET UP OUT OF BED,
ATTEMPTED TO STAND PATIENT AND SHE WAS TO WEAK TO SUPPORT HER WEIGHT, PATIETN
REDIRECTED BACK TO BED AND SHE BECAME COMBATIVE HITTING STAFF. PATIENT
MEDICATED PER EMAR. PATIENT PULLED OF TELE WIRES DURING THIS TIME AND BIT OF
THE END AND ATTEMPTED TO EAT IT (HEMOSTAT USED TO REMOVE OBJECTS FROM PATIENTS
MOUTH) - PROVIDER MD ANTOINE NOTIFIED AND STATED TO FURTHER MEDICATE HER AND
REAPPLY CARDIAC MONITOR WHEN ABLE.  PATIENT MEDICATED X2 DUE TO AGIATION AND
VEST RESTRAINT APPLIED. PATIENT IS NOW DROWSY/LETHARGIC DUE TO MEDICATIONS PER
EMAR AND IS MUMBLING INCOHERANTLY. RESP E/U ON ROOM AIR. AT THIS TIME PATIENT
CONTINUES TO PULL AT WIRES AND VEST - UNDIRECTABLE AND UNCOOEPRATIVE WITH
CARE. DURING THSI TIME PATIENT HAS REMAINED ON CONTINUOUS VIDEO MONITORING
FOR SAFETY - OF WHICH THIS RN HAS BEEN NOTIFIED MULTIPLE TIMES OF PATIENT
PULLING LINES AND ATTEMPTING TO GET OUT OF BED.
BED ALARM ON AT THIS TIME - PATIENT CHECK PER RESTRAINT MONITORING (SEE
RESTRAINT DOCUMENTATIONS.

## 2020-02-13 NOTE — NUR
SHIFT SUMMARY
ALERT; ORIENTED TO SELF. DIFFICULT TO RE-DIRECT. BECOMES IRRITABLE AND
AGGRESSIVE AT TIMES. CONFUSED AND FORGETFUL OF LIMITATIONS. COOPERATIVE WITH
CARE AT TIMES. MEDICATED PER EMAR FOR AGITATION. RESTRAINT DOCUMENTATION
COMPLETED THROUGHOUT SHIFT. UP WITH MULTIPLE ATTEMPTS TO USE THE RESTROOM;
UNSUCCESSFUL VOIDS. BLADDER SCAN YEILDED 863 ML. CONTACTED ON-CALL; ONE TIME
ORDER FOR I/O. I/O YEILDED 550 ML.  ON-CALL STATED THAT IF PATIENT CONTINUES
TO RETAIN MAYBE ATTENDING COULD CONSIDER SOMETHING FOR FLOW. REMAINED ADAMANT
THAT SHE WAS NOT IN ANY PAIN. BED IN LOWEST POSITION; ALARM ON. CALL LIGHT AND
BELONGINGS WITHIN REACH. WCTM. REPORT TO ONCOMING RN.

## 2020-02-14 LAB
ANION GAP SERPL CALCULATED.4IONS-SCNC: 8 MMOL/L (ref 6–16)
BUN SERPL-MCNC: 6 MG/DL (ref 8–24)
CALCIUM SERPL-MCNC: 8.7 MG/DL (ref 8.5–10.1)
CHLORIDE SERPL-SCNC: 109 MMOL/L (ref 98–108)
CO2 SERPL-SCNC: 25 MMOL/L (ref 21–32)
CREAT SERPL-MCNC: 0.7 MG/DL (ref 0.4–1)
GLUCOSE SERPL-MCNC: 103 MG/DL (ref 70–99)
POTASSIUM SERPL-SCNC: 3.2 MMOL/L (ref 3.5–5.5)
SODIUM SERPL-SCNC: 142 MMOL/L (ref 136–145)

## 2020-02-14 NOTE — NUR
***SHIFT NOTE***
PT HAS BEEN CALM AND COOPERATIE MOST OF SHIFT, AROUND 1700 PT BEGAN TO BECOME
CONFUSED AFTER TALKING WITH [place] STAFF WHEN SHE BECAME UPSET AS SHE DOES
NOT WISH TO MOVE TO [place]. PT IS FIXIATED THAT SHE HAS A BOOK THAT SHE IS
READING THERE WAS NO BOOOK WITH PT THERE IS NOT ONE IN HER BELONGINGS. PT IS
REDIRECTABLE BUT IS ARGUMENTIVE. PT HAS BEEN UP TO BSC AND BATHROOM FOR ALL
TOILETING TODAY NO INCONTINENCE

## 2020-02-14 NOTE — NUR
CARE ASSUMPTION
 
PT SLEEPING HEAVILY. WAKES TO VERBAL STIMULATION & GENTLE TOUCH. PT A&O TO
SELF AND LOCATION, PLEASANT & COOPERATIVE, FOLLOWING INSTRUCTIONS THEN FALLING
RIGHT BACK TO SLEEP. PT MEDICAL NO TELE STATUS. VSS. HR 50's-60's. SPO2 > 92%
ON RA. WILL CONTINUE TO MONITOR AND PROVIDE CARE.

## 2020-02-14 NOTE — NUR
SHIFT SUMMARY
 
UPON CARE ASSUMPTION, PT SLEEPING, WAKING TO VERBAL STIMULATION, LETHARGIC
AND MUMBLING INCOHERENTLY. PT THEN MORE ALERT AROUND 2030, ABLE TO STATE
NAME & LOCATION & TAKE ORAL MEDICATIONS SAFELY. PT INTERMITTENTLY SPEAKING
CLEARLY VS MUMBLING T/O SHIFT AS WELL AS BRIEFLY FOLLOWING INSTRUCTIONS &
THEN FORGETING. PT REQUIRING REORIENTATION AND GUIDANCE T/O SHIFT. PT IN POSEY
VEST T/O SHIFT D/T PULLING AT LINES & MULTIPLE ATTEMPTS TO GET OOB. CENTRAL
MONITORING ASSISTING IN CLOSER PT MONITORING AS WELL W/ MULTIPLE NOTIFACTIONS
TO STAFF THIS SHIFT OF PT PULLING AT LINES/ATTEMPTING TO GET OOB.
 
PT W/ 7 BEAT RUN OF VTACH THIS SHIFT WHILE PT SLEEPING. EVENT STRIP PRINTED
AND PLACED IN CHART. OTHERWISE VSS. MONITOR SHOWS SR, HR 60's. SPO2 > 92% ON
RA. STAPLES IN PLACE TO ABD MIDLINE INCISION. GAUZE COVERING PREVIOUS LLQ
PHUONG DRAIN SITE. PT CONTINENT W/ EPISODES OF INCONTINENCE OF URINE AND BOWEL,
USING BEDPAN AND WEARING ATTENDS.
 
WILL CONTINUE TO MONITOR AND PROVIDE CARE UNTIL REPORT OFF TO DAY SHIFT RN.

## 2020-02-14 NOTE — NUR
PT ALERT, CALM AND COOPERATIVE. PT IS ABLE TO ANSWER SOME QUESTIONS
APPROPRIATELY. PT WAS UP TO BEDSIDE COMMODE WITH MINIMAL ASSISTANCE.

## 2020-02-15 LAB
ANION GAP SERPL CALCULATED.4IONS-SCNC: 7 MMOL/L (ref 6–16)
BASOPHILS # BLD AUTO: 0.05 K/MM3 (ref 0–0.23)
BASOPHILS NFR BLD AUTO: 1 % (ref 0–2)
BUN SERPL-MCNC: 12 MG/DL (ref 8–24)
CALCIUM SERPL-MCNC: 8.8 MG/DL (ref 8.5–10.1)
CHLORIDE SERPL-SCNC: 109 MMOL/L (ref 98–108)
CO2 SERPL-SCNC: 25 MMOL/L (ref 21–32)
CREAT SERPL-MCNC: 0.95 MG/DL (ref 0.4–1)
DEPRECATED RDW RBC AUTO: 46.5 FL (ref 35.1–46.3)
EOSINOPHIL # BLD AUTO: 0.24 K/MM3 (ref 0–0.68)
EOSINOPHIL NFR BLD AUTO: 4 % (ref 0–6)
ERYTHROCYTE [DISTWIDTH] IN BLOOD BY AUTOMATED COUNT: 12.4 % (ref 11.7–14.2)
GLUCOSE SERPL-MCNC: 119 MG/DL (ref 70–99)
HCT VFR BLD AUTO: 36.9 % (ref 33–51)
HGB BLD-MCNC: 12.7 G/DL (ref 11.5–16)
IMM GRANULOCYTES # BLD AUTO: 0.03 K/MM3 (ref 0–0.1)
IMM GRANULOCYTES NFR BLD AUTO: 0 % (ref 0–1)
LYMPHOCYTES # BLD AUTO: 1.35 K/MM3 (ref 0.84–5.2)
LYMPHOCYTES NFR BLD AUTO: 20 % (ref 21–46)
MCHC RBC AUTO-ENTMCNC: 34.4 G/DL (ref 31.5–36.5)
MCV RBC AUTO: 103 FL (ref 80–100)
MONOCYTES # BLD AUTO: 0.5 K/MM3 (ref 0.16–1.47)
MONOCYTES NFR BLD AUTO: 7 % (ref 4–13)
NEUTROPHILS # BLD AUTO: 4.73 K/MM3 (ref 1.96–9.15)
NEUTROPHILS NFR BLD AUTO: 69 % (ref 41–73)
NRBC # BLD AUTO: 0 K/MM3 (ref 0–0.02)
NRBC BLD AUTO-RTO: 0 /100 WBC (ref 0–0.2)
PLATELET # BLD AUTO: 293 K/MM3 (ref 150–400)
POTASSIUM SERPL-SCNC: 3.4 MMOL/L (ref 3.5–5.5)
SODIUM SERPL-SCNC: 141 MMOL/L (ref 136–145)

## 2020-02-15 NOTE — NUR
SHIFT SUMMARY
POD #8, STAPLES INTACT, PHUONG DRSG SITE C/D/I, VSS, RESP UNLABORED, ON ROOM AIR.
PT REMAINS INTERMITTENTLY CONFUSED WITH PERIODS OF AGITATION WORSENING THIS
EVENING. SHE IS CONCERNED ABOUT HER DOGS AT HOME. PT HAS BEEN
REDIRECTED/REORIENTED MULTIPLE TIMES, SNACKS AND DISTRACTION PROVIDED, WALKS
AND EXERCISE ENC, PT'S 2 ADULT CHILDREN HAVE BEEN IN TO VISIT WITH HER A FEW
TIMES TODAY. SHE WAS GIVEN GIVEN 25 MG IV BENEDRYL THIS EVENING PER EMAR FOR
AGITATION AFTER DISCUSSING THE DISISION WITH HER DAUGHTER AND THE CHARGE
NURSE. PT IS CURRENTLY RESTING IN BED, WCTM AND REPORT TO NOC RN.

## 2020-02-15 NOTE — NUR
SHIFT SUMMARY
 
PT CONTINUES TO BE A&O TO SELF & LOCATION. PT MEDICAL NO TELE STATUS. SLEEPING
HEAVILY T/O SHIFT. VSS. HR 50's-60's. SPO2 > 92% ON RA. ABD MIDLINE INCISION
W/ STAPLES IN PLACE, OPEN TO AIR. PREVIOUS PHUONG DRAIN SITE TO LLQ DRESSED W/
GAUZE. WILL CONTINUE TO MONITOR AND PROVIDE CARE UNTIL REPORT OFF TO DAY SHIFT
RN.

## 2020-02-15 NOTE — NUR
CARE ASSUMPTION
 
UPON CARE ASSUMPTION, PT ALERT, DISORIENTED, STATING "I TOOK THE DOGS TO THE
VET TODAY, BROUGHT THEM HOME, BUT NOW I CAN'T FIND THEM." PT LOOKING FOR HER
DOGS IN HER ROOM, ASKING IF THEY ARE MAYBE OUTSIDE. PT REORIENTED AND REMINDED
OF DOGS BEING LOOKED AFTER BY HER FRIEND. PT THEN ASKING TO "SIT SOMEWHERE
WHERE ALL THE PEOPLE ARE." PT ASSISTED TO A CHAIR W/ HER BEDSIDE TABLE IN THE
[place] BY THE NURSES' STATION WHERE SHE SAT FOR A SHORT TIME UNTIL SHE WAS READY
TO GO BACK TO BED. PT MORE STEADY ON HER FEET REQUIRING ONLY SBA FOR SAFETY.
PT VERY PLEASANT & COOPERATIVE, CONTINUALLY EXPRESSING GRATITUDE FOR
CARE. PT ORIENTED TO SELF, PLACE, FAMILY, & FOLLOWING INSTRUCTIONS. PT MEDICAL
NO TELE STATUS. VSS. SOP2 > 92% ON RA. ABD MIDLINE INCISION W/ STAPLES
PRESENT, OPEN TO AIR. PREVIOUS LLQ PHUONG DRAIN SITE DRESSED W/ GAUZE. PT DENIES
PAIN/DISCOMFORT. WILL CONTINUE TO MONITOR AND PROVIDE CARE.

## 2020-02-16 LAB
ANION GAP SERPL CALCULATED.4IONS-SCNC: 6 MMOL/L (ref 6–16)
BUN SERPL-MCNC: 11 MG/DL (ref 8–24)
CALCIUM SERPL-MCNC: 8.7 MG/DL (ref 8.5–10.1)
CHLORIDE SERPL-SCNC: 110 MMOL/L (ref 98–108)
CO2 SERPL-SCNC: 25 MMOL/L (ref 21–32)
CREAT SERPL-MCNC: 0.82 MG/DL (ref 0.4–1)
GLUCOSE SERPL-MCNC: 87 MG/DL (ref 70–99)
MAGNESIUM SERPL-MCNC: 1.8 MG/DL (ref 1.6–2.4)
POTASSIUM SERPL-SCNC: 3.8 MMOL/L (ref 3.5–5.5)
SODIUM SERPL-SCNC: 141 MMOL/L (ref 136–145)

## 2020-02-16 NOTE — NUR
PT CONTINUES TO SHOW SOME CONFUSION AND AGITATION, OUT OF BED LOOKING FOR HER
"DOG" AND NEEDING REDIRECTING THAT SHE IN STILL IN THE HOSPITAL. CALL LIGHT IN
REACH.

## 2020-02-16 NOTE — NUR
SHIFT SUMMARY
 
PT MEDICAL NO TELE STATUS. PLEASANT & COOPERATIVE, ORIENTED TO SELF, PLACE,
FAMILY, & FOLLOWING INSTRUCTIONS. PT W/ EPISODES OF CONFUSION, REQUIRING
REORIENTATION. VSS. SPO2 > 92% ON RA. ABD MIDLINE INCISION W/ STAPLES
PRESENT, OPEN TO AIR. PREVIOUS LLQ PHUONG DRAIN SITE DRESSED W/ GAUZE. WILL
CONTINUE TO MONITOR AND PROVIDE CARE UNTIL REPORT OFF TO DAY SHIFT RN.

## 2020-02-16 NOTE — NUR
NURSING PCU DAYSHIFT:
 
Assumed care of pt at approx 0700. Alert, oriented to self/location/following
commands, forgetful at times, pleasant and fairly cooperative w/care. C/O
5-6/10 lower back pain, will discuss w/PMD poss lidocaine patch. Skin is
fragile though no breakdown is noted, midline incision open to air and
w/staples from recent sx, smaller abd incision dressed in gauze from previous
PHUONG drain placement. General weakness noted, able to ambulate w/one staff
assist.
 
No tele in place, no c/o CP/pressure, , no noted edema. L/S cta t/o, O2
sat mid 90's on RA, denies dyspnea, no noted cough. Abd mildly
distended/tender w/palp, BT hypoactive, voiding w/o difficulty though
experiences some urgency and incontinence. PIV x1, s/l.
 
No s/s of acute distress at this time. Call light in reach though bed alarm is
set for safety purposes. Awaiting rounding from PMD, pt remains medical status
w/o telemetry. Pt denies any current needs, cont to monitor for any changes.

## 2020-02-16 NOTE — NUR
PT OUT OF BED SEVERAL TIMES, EACH TIME STAFF REDIRECTED TO USE CALL LIGHT FOR
ASSISTANCE. PT NON COMPLIANT. REMAINS ON OBSERVATION VIDEO FOR SAFETY THIS
SHIFT. CURRENTLY RESTING QUIETLY IN BED. CALL LIGHT IN REACH.

## 2020-02-16 NOTE — NUR
ASSUMED CARE
PT ARRIVED VIA W/C AND TRANSFERRED SELF TO BED INDEPENDENTLY, SETTLED TO ROOM,
CALL LIGHT NEAR BY, BED IN LOWEST POSITION. WARM BLANKET PROVIDED UPON PT
REQUEST. WILL CONTINUE TO MONITOR.

## 2020-02-16 NOTE — NUR
Shift Summary
Pt arrived to unit @ 1750, received report from VAL [place]. Pt apparently
had a bad reaction to Ativan in PCU per report. This RN passed this info to
night VAL [place]. Pt has been resting quitely in bed and up to the bathroom x 1.
No other changes.

## 2020-02-17 NOTE — NUR
EARLIER IN THE SHIFT MADE SEVERAL ATTEMPTS TO GET OUT OF BED CAUSING ALARM TO
GO OFF. BEHAVIOR REDIRECTED, REORIENTED BACK TO UNDERSTANDING THAT SHE WAS IN
THE HOSPITAL AND THAT HER DOG WAS NOT IN THE HOSPITAL AND TO TRY TO GET SOME
REST. CURRENTLY RESTING QUIETLY. CALL LIGHT IN REACH. VIDEO OBSERVATION
CONTINUES. BED ALARM ON.

## 2020-02-17 NOTE — NUR
MEDS CALLED TO PHARMACY
MEDS CALLED TO [facility] IN [place]. PT DAUGHTER NOTIFIED.

## 2020-02-17 NOTE — NUR
PT DISCHARGED.
PT DISCHARGED IN STABLE CONDITION. VSS. NO CHANGES IN ASSESSMENT PRIOR TO DC.
PT TO HAVE STAPLES REMOVED AS O/P IN [place]. MEDS FAXED TO [facility] IN
[place]. IV REMOVED & INTACT. PATIENT, DAUGHTER, AND SON EDUCATED ON DC
INSTRUCTIONS. PT WHEELED OUT BY AIDE AND DRIVEN HOME BY DAUGHTER.